# Patient Record
Sex: MALE | URBAN - METROPOLITAN AREA
[De-identification: names, ages, dates, MRNs, and addresses within clinical notes are randomized per-mention and may not be internally consistent; named-entity substitution may affect disease eponyms.]

---

## 2022-01-17 ENCOUNTER — NURSE TRIAGE (OUTPATIENT)
Dept: OTHER | Facility: OTHER | Age: 37
End: 2022-01-17

## 2022-01-17 DIAGNOSIS — Z20.822 ENCOUNTER FOR SCREENING LABORATORY TESTING FOR COVID-19 VIRUS: Primary | ICD-10-CM

## 2022-01-17 PROCEDURE — U0003 INFECTIOUS AGENT DETECTION BY NUCLEIC ACID (DNA OR RNA); SEVERE ACUTE RESPIRATORY SYNDROME CORONAVIRUS 2 (SARS-COV-2) (CORONAVIRUS DISEASE [COVID-19]), AMPLIFIED PROBE TECHNIQUE, MAKING USE OF HIGH THROUGHPUT TECHNOLOGIES AS DESCRIBED BY CMS-2020-01-R: HCPCS | Performed by: FAMILY MEDICINE

## 2022-01-17 PROCEDURE — U0005 INFEC AGEN DETEC AMPLI PROBE: HCPCS | Performed by: FAMILY MEDICINE

## 2022-01-17 NOTE — TELEPHONE ENCOUNTER
Patient is requesting a covid test and does not have a [facility] PCP  Order placed  Patient informed of [address] [facility] testing site and was advised of hours of operation, address, to wear a mask, and to stay in the car  Patient verbalized understanding  Link sent to Patients email address to create a My Chart account to check for results  Reason for Disposition   [1] COVID-19 infection suspected by caller or triager AND [2] mild symptoms (cough, fever, or others) AND [3] has not gotten tested yet    Answer Assessment - Initial Assessment Questions  Were you within 6 feet or less, for up to 15 minutes or more with a person that has a confirmed COVID-19 test? Denies    What was the date of your exposure? Unknown    Are you experiencing any symptoms attributed to the virus?  (Assess for SOB, cough, fever, difficulty breathing) Sore throat, body aches, cough, sneezing, runny nose,     HIGH RISK: Do you have any history heart or lung conditions, weakened immune system, diabetes, Asthma, CHF, HIV, COPD, Chemo, renal failure, sickle cell, etc? Denies    PREGNANCY: Are you pregnant or did you recently give birth?  N/a    VACCINE: "Have you gotten the COVID-19 vaccine?" If Yes ask: "Which one, how many shots, when did you get it?" Yes, Moderna x 2 shots    Protocols used: CORONAVIRUS (COVID-19) [facility]

## 2022-01-17 NOTE — TELEPHONE ENCOUNTER
Regarding: Covid/sore throat body aches  ----- Message from [place] sent at 1/17/2022  5:42 PM EST -----  "I have a sore throat and body aches and I want to get tested" patient was able to provide subscriber Id"